# Patient Record
Sex: FEMALE | Race: WHITE | NOT HISPANIC OR LATINO | Employment: PART TIME | URBAN - NONMETROPOLITAN AREA
[De-identification: names, ages, dates, MRNs, and addresses within clinical notes are randomized per-mention and may not be internally consistent; named-entity substitution may affect disease eponyms.]

---

## 2024-07-23 DIAGNOSIS — I25.10 CORONARY ARTERY DISEASE INVOLVING NATIVE CORONARY ARTERY OF NATIVE HEART, UNSPECIFIED WHETHER ANGINA PRESENT: ICD-10-CM

## 2024-07-23 RX ORDER — PRASUGREL 10 MG/1
10 TABLET, FILM COATED ORAL DAILY
Qty: 90 TABLET | Refills: 0 | Status: SHIPPED | OUTPATIENT
Start: 2024-07-23 | End: 2025-07-23

## 2024-07-23 RX ORDER — ROSUVASTATIN CALCIUM 5 MG/1
5 TABLET, COATED ORAL DAILY
Qty: 90 TABLET | Refills: 0 | Status: SHIPPED | OUTPATIENT
Start: 2024-07-23 | End: 2025-07-23

## 2024-10-12 ENCOUNTER — HOSPITAL ENCOUNTER (EMERGENCY)
Facility: HOSPITAL | Age: 44
Discharge: HOME | End: 2024-10-12
Attending: EMERGENCY MEDICINE

## 2024-10-12 VITALS
SYSTOLIC BLOOD PRESSURE: 140 MMHG | BODY MASS INDEX: 43.06 KG/M2 | TEMPERATURE: 97.4 F | HEART RATE: 84 BPM | DIASTOLIC BLOOD PRESSURE: 86 MMHG | OXYGEN SATURATION: 96 % | WEIGHT: 234 LBS | HEIGHT: 62 IN | RESPIRATION RATE: 18 BRPM

## 2024-10-12 DIAGNOSIS — K02.9 PAIN DUE TO DENTAL CARIES: Primary | ICD-10-CM

## 2024-10-12 PROCEDURE — 2500000001 HC RX 250 WO HCPCS SELF ADMINISTERED DRUGS (ALT 637 FOR MEDICARE OP): Performed by: EMERGENCY MEDICINE

## 2024-10-12 PROCEDURE — 99283 EMERGENCY DEPT VISIT LOW MDM: CPT

## 2024-10-12 RX ORDER — AMOXICILLIN 500 MG/1
500 CAPSULE ORAL 3 TIMES DAILY
Qty: 21 CAPSULE | Refills: 0 | Status: SHIPPED | OUTPATIENT
Start: 2024-10-12 | End: 2024-10-19

## 2024-10-12 RX ORDER — AMOXICILLIN 250 MG/1
500 CAPSULE ORAL ONCE
Status: COMPLETED | OUTPATIENT
Start: 2024-10-12 | End: 2024-10-12

## 2024-10-12 RX ORDER — IBUPROFEN 600 MG/1
600 TABLET ORAL EVERY 8 HOURS PRN
Qty: 30 TABLET | Refills: 0 | Status: SHIPPED | OUTPATIENT
Start: 2024-10-12

## 2024-10-12 RX ORDER — IBUPROFEN 600 MG/1
600 TABLET ORAL ONCE
Status: COMPLETED | OUTPATIENT
Start: 2024-10-12 | End: 2024-10-12

## 2024-10-12 RX ADMIN — IBUPROFEN 600 MG: 600 TABLET ORAL at 20:09

## 2024-10-12 RX ADMIN — AMOXICILLIN 500 MG: 250 CAPSULE ORAL at 20:08

## 2024-10-12 ASSESSMENT — COLUMBIA-SUICIDE SEVERITY RATING SCALE - C-SSRS
6. HAVE YOU EVER DONE ANYTHING, STARTED TO DO ANYTHING, OR PREPARED TO DO ANYTHING TO END YOUR LIFE?: NO
1. IN THE PAST MONTH, HAVE YOU WISHED YOU WERE DEAD OR WISHED YOU COULD GO TO SLEEP AND NOT WAKE UP?: NO
2. HAVE YOU ACTUALLY HAD ANY THOUGHTS OF KILLING YOURSELF?: NO

## 2024-10-12 ASSESSMENT — PAIN - FUNCTIONAL ASSESSMENT: PAIN_FUNCTIONAL_ASSESSMENT: 0-10

## 2024-10-12 ASSESSMENT — PAIN SCALES - GENERAL
PAINLEVEL_OUTOF10: 9
PAINLEVEL_OUTOF10: 9

## 2024-10-12 ASSESSMENT — PAIN DESCRIPTION - LOCATION: LOCATION: TEETH

## 2024-10-12 NOTE — DISCHARGE INSTRUCTIONS
Ibuprofen and amoxicillin as prescribed.    Follow-up with the [facility] dental clinic Monday morning for outpatient care.    Return for worsening symptoms or concerns.

## 2024-10-12 NOTE — ED PROVIDER NOTES
[facility]   ED  Provider Note  10/12/2024  7:40 PM  AC01/AC01      No chief complaint on file.       History of Present Illness:   Masha Parrish is a 44 y.o. female presenting to the ED for dental pain, beginning several days ago.  The complaint has been persistent, gradually worsening in severity, and worsened by chewing.  Patient has multiple dental caries along the left side of her lower jaw.  She claims increasing pain for the past 4 to 5 days.  She denies any fever chills difficulty breathing difficulty swallowing.  She does not have a stiff neck or headache.      Review of Systems:   Pertinent positives and review of systems as noted above.  Remaining 10 review of systems is negative or noncontributory to today's episode of care.  Review of Systems       --------------------------------------------- PAST HISTORY ---------------------------------------------  Past Medical History: No past medical history on file.     Past Surgical History: No past surgical history on file.     Social History:   Social History     Social History Narrative    Not on file        Family History: family history is not on file. Unless otherwise noted, family history is non contributory    Patient's Medications   New Prescriptions    No medications on file   Previous Medications    PRASUGREL (EFFIENT) 10 MG TABLET    Take 1 tablet (10 mg) by mouth once daily.    ROSUVASTATIN (CRESTOR) 5 MG TABLET    Take 1 tablet (5 mg) by mouth once daily.   Modified Medications    No medications on file   Discontinued Medications    No medications on file      The patient’s home medications have been reviewed.    Allergies: Patient has no allergy information on record.    -------------------------------------------------- RESULTS -------------------------------------------------  All laboratory and radiology results have been personally reviewed by myself   LABS:  Labs Reviewed - No data to display      RADIOLOGY:  Interpreted by  Radiologist.  No orders to display       No results found for this or any previous visit (from the past 4464 hour(s)).  ------------------------- NURSING NOTES AND VITALS REVIEWED ---------------------------   The nursing notes within the ED encounter and vital signs as below have been reviewed.   There were no vitals taken for this visit.  Oxygen Saturation Interpretation: Normal      ---------------------------------------------------PHYSICAL EXAM--------------------------------------  Physical Exam   Constitutional/General: Alert,  well appearing, non toxic in NAD  Head: Normocephalic and atraumatic  Eyes: PERRL, EOMI, conjunctiva normal, sclera non icteric  Mouth: Oropharynx clear, handling secretions, no trismus, no asymmetry of the posterior oropharynx or uvular edema multiple broken off and rotting teeth in the left lower mandible.  Neck: Supple, full ROM, non tender to palpation in the midline, no stridor, no crepitus, no meningeal signs  Respiratory: Lungs clear to auscultation bilaterally, no wheezes, rales, or rhonchi. Not in respiratory distress  Cardiovascular:  Regular rate. Regular rhythm. No murmurs, gallops, or rubs. 2+ distal pulses  Chest: No chest wall tenderness  GI:  Abdomen Soft, Non tender, Non distended.  +BS. No organomegaly, no palpable masses,  No rebound, guarding, or rigidity.   Musculoskeletal: Moves all extremities x 4. Warm and well perfused, no clubbing, cyanosis, or edema. Capillary refill <3 seconds  Integument: skin warm and dry. No rashes.   Lymphatic: Mild left submandibular adenopathy  Neurologic: No focal deficits, symmetric strength 5/5 in the upper and lower extremities bilaterally  Psychiatric: Normal Affect    Procedures    ------------------------------ ED COURSE/MEDICAL DECISION MAKING----------------------  Diagnoses as of 10/12/24 1947   Pain due to dental caries      Patient has multiple infected teeth in the left lower gum that are broken off.  She replaced on  amoxicillin and ibuprofen.  I have asked to follow-up with [facility] on Monday for outpatient care.      Medical Decision Making:   Discharged to home    Diagnoses as of 10/12/24 1947   Pain due to dental caries      Counseling:   The emergency provider has spoken with the patient and discussed today’s results, in addition to providing specific details for the plan of care and counseling regarding the diagnosis and prognosis.  Questions are answered at this time and they are agreeable with the plan.      --------------------------------- IMPRESSION AND DISPOSITION ---------------------------------        IMPRESSION  1. Pain due to dental caries        DISPOSITION  Disposition: Discharge to home  Patient condition is fair      Billing Provider Critical Care Time: 0 minutes     Jerod Hearn MD  10/12/24 1952

## 2024-10-28 DIAGNOSIS — I25.10 CORONARY ARTERY DISEASE INVOLVING NATIVE CORONARY ARTERY OF NATIVE HEART, UNSPECIFIED WHETHER ANGINA PRESENT: ICD-10-CM

## 2024-10-29 RX ORDER — ROSUVASTATIN CALCIUM 5 MG/1
5 TABLET, COATED ORAL DAILY
Qty: 90 TABLET | Refills: 0 | Status: SHIPPED | OUTPATIENT
Start: 2024-10-29 | End: 2025-10-29

## 2024-10-29 RX ORDER — PRASUGREL 10 MG/1
10 TABLET, FILM COATED ORAL DAILY
Qty: 90 TABLET | Refills: 0 | Status: SHIPPED | OUTPATIENT
Start: 2024-10-29 | End: 2025-10-29

## 2024-11-13 DIAGNOSIS — I10 HYPERTENSION, UNSPECIFIED TYPE: ICD-10-CM

## 2024-11-13 DIAGNOSIS — I25.10 CORONARY ARTERY DISEASE INVOLVING NATIVE CORONARY ARTERY OF NATIVE HEART, UNSPECIFIED WHETHER ANGINA PRESENT: Primary | ICD-10-CM

## 2024-11-13 RX ORDER — ESCITALOPRAM OXALATE 20 MG/1
1 TABLET ORAL DAILY
COMMUNITY
Start: 2022-02-24

## 2024-11-13 RX ORDER — PRASUGREL 10 MG/1
10 TABLET, FILM COATED ORAL DAILY
Qty: 30 TABLET | Refills: 0 | Status: SHIPPED | OUTPATIENT
Start: 2024-11-13 | End: 2025-11-13

## 2024-11-13 RX ORDER — ROSUVASTATIN CALCIUM 5 MG/1
5 TABLET, COATED ORAL DAILY
Qty: 30 TABLET | Refills: 0 | Status: SHIPPED | OUTPATIENT
Start: 2024-11-13 | End: 2025-11-13

## 2024-11-13 RX ORDER — TRAZODONE HYDROCHLORIDE 100 MG/1
1 TABLET ORAL NIGHTLY
COMMUNITY
Start: 2022-02-24

## 2024-11-13 RX ORDER — ATORVASTATIN CALCIUM 80 MG/1
80 TABLET, FILM COATED ORAL DAILY
COMMUNITY
Start: 2019-03-27

## 2024-11-13 RX ORDER — ACETAMINOPHEN 325 MG/1
325 TABLET ORAL EVERY 4 HOURS PRN
COMMUNITY
Start: 2019-03-27

## 2024-11-13 RX ORDER — METOPROLOL SUCCINATE 25 MG/1
1 TABLET, EXTENDED RELEASE ORAL DAILY
COMMUNITY
Start: 2019-03-27

## 2024-11-13 RX ORDER — ASPIRIN 81 MG/1
1 TABLET ORAL DAILY
COMMUNITY
Start: 2019-03-27

## 2024-11-22 ENCOUNTER — OFFICE VISIT (OUTPATIENT)
Dept: CARDIOLOGY | Facility: CLINIC | Age: 44
End: 2024-11-22

## 2024-11-22 VITALS
OXYGEN SATURATION: 97 % | HEART RATE: 87 BPM | SYSTOLIC BLOOD PRESSURE: 118 MMHG | BODY MASS INDEX: 46.56 KG/M2 | DIASTOLIC BLOOD PRESSURE: 77 MMHG | HEIGHT: 62 IN | WEIGHT: 253 LBS

## 2024-11-22 DIAGNOSIS — I10 HYPERTENSION, UNSPECIFIED TYPE: ICD-10-CM

## 2024-11-22 DIAGNOSIS — I25.10 CORONARY ARTERY DISEASE INVOLVING NATIVE CORONARY ARTERY OF NATIVE HEART, UNSPECIFIED WHETHER ANGINA PRESENT: ICD-10-CM

## 2024-11-22 DIAGNOSIS — E78.2 MIXED HYPERLIPIDEMIA: Primary | ICD-10-CM

## 2024-11-22 DIAGNOSIS — F17.200 SMOKING: ICD-10-CM

## 2024-11-22 PROCEDURE — 99406 BEHAV CHNG SMOKING 3-10 MIN: CPT | Performed by: NURSE PRACTITIONER

## 2024-11-22 PROCEDURE — 3074F SYST BP LT 130 MM HG: CPT | Performed by: NURSE PRACTITIONER

## 2024-11-22 PROCEDURE — 3078F DIAST BP <80 MM HG: CPT | Performed by: NURSE PRACTITIONER

## 2024-11-22 PROCEDURE — 3008F BODY MASS INDEX DOCD: CPT | Performed by: NURSE PRACTITIONER

## 2024-11-22 PROCEDURE — 99213 OFFICE O/P EST LOW 20 MIN: CPT | Performed by: NURSE PRACTITIONER

## 2024-11-22 RX ORDER — PRASUGREL 10 MG/1
10 TABLET, FILM COATED ORAL DAILY
Qty: 30 TABLET | Refills: 11 | Status: SHIPPED | OUTPATIENT
Start: 2024-11-22 | End: 2025-11-22

## 2024-11-22 RX ORDER — NITROGLYCERIN 0.4 MG/1
0.4 TABLET SUBLINGUAL EVERY 5 MIN PRN
Qty: 100 TABLET | Refills: 11 | Status: SHIPPED | OUTPATIENT
Start: 2024-11-22 | End: 2025-11-22

## 2024-11-22 RX ORDER — ROSUVASTATIN CALCIUM 5 MG/1
5 TABLET, COATED ORAL DAILY
Qty: 30 TABLET | Refills: 11 | Status: SHIPPED | OUTPATIENT
Start: 2024-11-22 | End: 2025-11-22

## 2024-11-22 NOTE — PROGRESS NOTES
Primary Care Physician: No Assigned PCP Generic Provider, MD    Date of Visit: 11/22/2024 11:00 AM EST  Location of visit:  [facility]   Type of Visit: Follow up        Chief Complaint   Patient presents with    Follow-up     Here for 1 year follow up, no cardiac complaints today     HPI / Summary:   Masha Parrish is a 44 y.o. female  with CAD. Anterior STEMI S/P PPCI -LAD with VINNIE 3/26/2019. Preserved LV systolic function. HLP. Smoking. F/h/o CAD. Obesity. SAMI    who returns for routine follow up     Notes some Sob with activity feels like due to weight gain. Left arm numbness, Sleep on it wrong. Not during the day with work or activity. Really paying attention to symptoms.   She is working on becoming more active as well as smoking cessation.     12 system review is negative except as noted above    Medical History:   No past medical history on file.    Social History:   Tobacco Use: High Risk (10/12/2024)    Patient History     Smoking Tobacco Use: Every Day     Smokeless Tobacco Use: Current     Passive Exposure: Not on file       MEDICATIONS:   Current Outpatient Medications   Medication Instructions    acetaminophen (TYLENOL) 325 mg, Every 4 hours PRN    aspirin 81 mg EC tablet 1 tablet, Daily    atorvastatin (LIPITOR) 80 mg, Daily    escitalopram (Lexapro) 20 mg tablet 1 tablet, Daily    ibuprofen 600 mg, oral, Every 8 hours PRN    metoprolol succinate XL (Toprol-XL) 25 mg 24 hr tablet 1 tablet, Daily    prasugrel (EFFIENT) 10 mg, oral, Daily    rosuvastatin (CRESTOR) 5 mg, oral, Daily    ticagrelor (BRILINTA) 90 mg, 2 times daily    traZODone (Desyrel) 100 mg tablet 1 tablet, Nightly       IMAGING REPORTS INDEPENDENTLY REVIEWED:   NM stress test 3/30/2023  IMPRESSION:  1. Negative myocardial perfusion study without evidence of inducible  myocardial ischemia or prior infarction.  2. The left ventricle is normal in size.  3. Normal LV wall motion with a post-stress LV EF estimated at 65%.   Summary:  1.  No electrocardiographic or clinical evidence for ischemia at a maximal infusion.  2. Nuclear image results are reported separately.     Echocardiogram 3/14/2023  CONCLUSIONS:  1. Left ventricular systolic function is normal with a 55-60% estimated ejection fraction.    [facility] 3/26/2019  Recommendations:  Aspirin 81mg daily indefinitely and P2Y12 inhibition for at least one year.  Optiimze CAD medical therapy.  Consider cardiac rehab.  Smoking cessation is essential.     ____________________________________________________________________________________  CONCLUSIONS:   1. Left main: no significant angiographic disease.   2. LAD: 90% proximal LAD STEMI culprit lesion.   3. LCx: no significant angiographic disease.   4. RCA: no significant angiographic disease.   5. LVEDP 26mmHg, no aortic stenosis on LV-Ao gradient.   6. Successful PCI to proximal LAD STEMI culprit with a 3.0 x 30mm Resolute VINNIE post-dilated distally with a 3.0mm NC balloon at 24atm and proximally with a 3.5mm NC balloon at 24atm.       LABS:    CBC with Differential:    Lab Results   Component Value Date    WBC CANCELED 03/14/2023    RBC CANCELED 03/14/2023    HGB CANCELED 03/14/2023    HCT CANCELED 03/14/2023    PLT CANCELED 03/14/2023    MCV CANCELED 03/14/2023    MCHC CANCELED 03/14/2023    RDW CANCELED 03/14/2023    NRBC CANCELED 03/14/2023    LYMPHOPCT 33.0 03/13/2023    MONOPCT 5.4 03/13/2023    EOSPCT 1.8 03/13/2023    BASOPCT 0.3 03/13/2023    MONOSABS 0.58 03/13/2023    LYMPHSABS 3.53 03/13/2023    EOSABS 0.19 03/13/2023    BASOSABS 0.03 03/13/2023     CMP:    Lab Results   Component Value Date    NA CANCELED 03/14/2023    K CANCELED 03/14/2023    CL CANCELED 03/14/2023    CO2 CANCELED 03/14/2023    BUN CANCELED 03/14/2023    CREATININE CANCELED 03/14/2023    GLUCOSE CANCELED 03/14/2023    PROT 7.4 03/25/2019    CALCIUM CANCELED 03/14/2023    BILITOT 0.2 03/25/2019    ALKPHOS 69 03/25/2019    AST 22 03/25/2019    ALT 25 03/25/2019     BMP:   "  Lab Results   Component Value Date    NA CANCELED 03/14/2023    K CANCELED 03/14/2023    CL CANCELED 03/14/2023    CO2 CANCELED 03/14/2023    BUN CANCELED 03/14/2023    CREATININE CANCELED 03/14/2023    CALCIUM CANCELED 03/14/2023    GLUCOSE CANCELED 03/14/2023     Magnesium:No results found for: \"MG\"  Troponin:    Lab Results   Component Value Date    TROPHS CANCELED 03/13/2023    TROPHS CANCELED 03/13/2023    TROPHS 4 03/13/2023     BNP: No results found for: \"BNP\"      Lipid Panel:  Lab Results   Component Value Date    HDL CANCELED 03/14/2023    CHHDL CANCELED 03/14/2023    VLDL CANCELED 03/14/2023    TRIG CANCELED 03/14/2023    NHDL CANCELED 03/14/2023        Lab work and imaging results independently reviewed by me     Visit Vitals  /77   Pulse 87   Ht 1.575 m (5' 2\")   Wt 115 kg (253 lb)   SpO2 97%   BMI 46.27 kg/m²   Smoking Status Every Day   BSA 2.24 m²       Vitals reviewed.   Constitutional:       General: Awake.      Appearance: Normal appearance. Well-developed and not in distress. Morbidly obese.   Eyes:      General: Lids are normal.      Pupils: Pupils are equal, round, and reactive to light.   HENT:      Right Ear: External ear normal.      Left Ear: External ear normal.      Nose: Nose normal.    Mouth/Throat:      Lips: Pink.      Mouth: Mucous membranes are moist.   Neck:      Vascular: JVD normal.   Pulmonary:      Breath sounds: Normal air entry.   Cardiovascular:      PMI at left midclavicular line. Normal rate. Regular rhythm. Normal S1. Normal S2.       Murmurs: There is no murmur.   Edema:     Peripheral edema absent.   Abdominal:      General: Bowel sounds are normal.      Palpations: Abdomen is soft.      Tenderness: There is no abdominal tenderness.   Musculoskeletal: Normal range of motion.      Cervical back: Full passive range of motion without pain, normal range of motion and neck supple. Skin:     General: Skin is warm and dry.   Neurological:      General: No focal deficit " present.      Mental Status: Alert, oriented to person, place, and time and oriented to person, place and time. Mental status is at baseline.   Psychiatric:         Attention and Perception: Attention normal.         Mood and Affect: Mood normal.         Speech: Speech normal.         Behavior: Behavior is cooperative.         Problem List Items Addressed This Visit             ICD-10-CM    Coronary artery disease involving native coronary artery of native heart I25.10    Relevant Medications    prasugrel (Effient) 10 mg tablet    rosuvastatin (Crestor) 5 mg tablet    Hypertension I10    Relevant Medications    prasugrel (Effient) 10 mg tablet    rosuvastatin (Crestor) 5 mg tablet     Masha is doing well from a CV perspective without angina or symptoms suggestive of decompensated HF   --Prasugrel 10mg daily indefinitely or ASA 81mg daily  --Metoprolol succinate 25mg daily  --SL nitroglycerin script given and education provided    HTN, controlled     HLD, mixed  --No recent LDL  -- Rosuvastatin 5mg daily  Mediterranean / DASH diet   150 minutes of symptom limited exercise / week     Follow up 1 year or sooner if needed  Encourage follow up and establishing with PCP.- she is between jobs right now and without insurance    We discussed the importance of complete smoking cessations, pharmacological and non pharmacological options to do so.  She declines assistance at this time.    11/22/24 at 10:48 AM - SAGE Hough    Orders:  No orders of the defined types were placed in this encounter.      Followup Appts:  Future Appointments   Date Time Provider Department Center   11/22/2024 11:00 AM SAGE Hough [id number] [place]

## 2024-11-22 NOTE — PATIENT INSTRUCTIONS
Continue all medication as prescribed    Follow up in 1 year or sooner if needed  Call with questions or concerns

## 2024-11-26 PROBLEM — F17.200 SMOKING: Status: ACTIVE | Noted: 2024-11-26

## 2024-11-26 PROBLEM — E78.2 MIXED HYPERLIPIDEMIA: Status: ACTIVE | Noted: 2024-11-26

## 2024-12-03 ENCOUNTER — APPOINTMENT (OUTPATIENT)
Dept: CARDIOLOGY | Facility: CLINIC | Age: 44
End: 2024-12-03

## 2025-06-14 ENCOUNTER — APPOINTMENT (OUTPATIENT)
Dept: CARDIOLOGY | Facility: HOSPITAL | Age: 45
End: 2025-06-14
Payer: COMMERCIAL

## 2025-06-14 ENCOUNTER — APPOINTMENT (OUTPATIENT)
Dept: RADIOLOGY | Facility: HOSPITAL | Age: 45
End: 2025-06-14
Payer: COMMERCIAL

## 2025-06-14 ENCOUNTER — HOSPITAL ENCOUNTER (EMERGENCY)
Facility: HOSPITAL | Age: 45
Discharge: HOME | End: 2025-06-14
Attending: EMERGENCY MEDICINE
Payer: COMMERCIAL

## 2025-06-14 VITALS
BODY MASS INDEX: 46.56 KG/M2 | DIASTOLIC BLOOD PRESSURE: 63 MMHG | HEIGHT: 62 IN | HEART RATE: 79 BPM | RESPIRATION RATE: 18 BRPM | SYSTOLIC BLOOD PRESSURE: 105 MMHG | TEMPERATURE: 98 F | OXYGEN SATURATION: 95 % | WEIGHT: 253 LBS

## 2025-06-14 DIAGNOSIS — Z72.0 TOBACCO USE: ICD-10-CM

## 2025-06-14 DIAGNOSIS — J40 BRONCHITIS: Primary | ICD-10-CM

## 2025-06-14 LAB
ALBUMIN SERPL BCP-MCNC: 4.4 G/DL (ref 3.4–5)
ALP SERPL-CCNC: 73 U/L (ref 33–110)
ALT SERPL W P-5'-P-CCNC: 54 U/L (ref 7–45)
ANION GAP SERPL CALC-SCNC: 9 MMOL/L (ref 10–20)
AST SERPL W P-5'-P-CCNC: 29 U/L (ref 9–39)
B-HCG SERPL-ACNC: 3 MIU/ML
BASOPHILS # BLD AUTO: 0.03 X10*3/UL (ref 0–0.1)
BASOPHILS NFR BLD AUTO: 0.3 %
BILIRUB SERPL-MCNC: 0.3 MG/DL (ref 0–1.2)
BUN SERPL-MCNC: 11 MG/DL (ref 6–23)
CALCIUM SERPL-MCNC: 9.8 MG/DL (ref 8.6–10.3)
CARDIAC TROPONIN I PNL SERPL HS: 7 NG/L (ref 0–13)
CARDIAC TROPONIN I PNL SERPL HS: 8 NG/L (ref 0–13)
CHLORIDE SERPL-SCNC: 101 MMOL/L (ref 98–107)
CO2 SERPL-SCNC: 29 MMOL/L (ref 21–32)
CREAT SERPL-MCNC: 1.02 MG/DL (ref 0.5–1.05)
D DIMER PPP FEU-MCNC: 314 NG/ML FEU
EGFRCR SERPLBLD CKD-EPI 2021: 69 ML/MIN/1.73M*2
EOSINOPHIL # BLD AUTO: 0.16 X10*3/UL (ref 0–0.7)
EOSINOPHIL NFR BLD AUTO: 1.6 %
ERYTHROCYTE [DISTWIDTH] IN BLOOD BY AUTOMATED COUNT: 14.9 % (ref 11.5–14.5)
GLUCOSE SERPL-MCNC: 124 MG/DL (ref 74–99)
HCT VFR BLD AUTO: 45.3 % (ref 36–46)
HGB BLD-MCNC: 14.7 G/DL (ref 12–16)
IMM GRANULOCYTES # BLD AUTO: 0.1 X10*3/UL (ref 0–0.7)
IMM GRANULOCYTES NFR BLD AUTO: 1 % (ref 0–0.9)
LYMPHOCYTES # BLD AUTO: 3.54 X10*3/UL (ref 1.2–4.8)
LYMPHOCYTES NFR BLD AUTO: 34.3 %
MCH RBC QN AUTO: 26.7 PG (ref 26–34)
MCHC RBC AUTO-ENTMCNC: 32.5 G/DL (ref 32–36)
MCV RBC AUTO: 82 FL (ref 80–100)
MONOCYTES # BLD AUTO: 0.68 X10*3/UL (ref 0.1–1)
MONOCYTES NFR BLD AUTO: 6.6 %
NEUTROPHILS # BLD AUTO: 5.81 X10*3/UL (ref 1.2–7.7)
NEUTROPHILS NFR BLD AUTO: 56.2 %
NRBC BLD-RTO: 0 /100 WBCS (ref 0–0)
PLATELET # BLD AUTO: 294 X10*3/UL (ref 150–450)
POTASSIUM SERPL-SCNC: 3.2 MMOL/L (ref 3.5–5.3)
PROT SERPL-MCNC: 7.6 G/DL (ref 6.4–8.2)
RBC # BLD AUTO: 5.51 X10*6/UL (ref 4–5.2)
SODIUM SERPL-SCNC: 136 MMOL/L (ref 136–145)
WBC # BLD AUTO: 10.3 X10*3/UL (ref 4.4–11.3)

## 2025-06-14 PROCEDURE — 2500000002 HC RX 250 W HCPCS SELF ADMINISTERED DRUGS (ALT 637 FOR MEDICARE OP, ALT 636 FOR OP/ED): Performed by: EMERGENCY MEDICINE

## 2025-06-14 PROCEDURE — 94640 AIRWAY INHALATION TREATMENT: CPT

## 2025-06-14 PROCEDURE — 71045 X-RAY EXAM CHEST 1 VIEW: CPT | Performed by: STUDENT IN AN ORGANIZED HEALTH CARE EDUCATION/TRAINING PROGRAM

## 2025-06-14 PROCEDURE — 84484 ASSAY OF TROPONIN QUANT: CPT | Mod: 91 | Performed by: EMERGENCY MEDICINE

## 2025-06-14 PROCEDURE — 94640 AIRWAY INHALATION TREATMENT: CPT | Mod: MUEWO

## 2025-06-14 PROCEDURE — 85025 COMPLETE CBC W/AUTO DIFF WBC: CPT | Performed by: EMERGENCY MEDICINE

## 2025-06-14 PROCEDURE — 94664 DEMO&/EVAL PT USE INHALER: CPT | Mod: 59

## 2025-06-14 PROCEDURE — 2500000001 HC RX 250 WO HCPCS SELF ADMINISTERED DRUGS (ALT 637 FOR MEDICARE OP)

## 2025-06-14 PROCEDURE — 2500000004 HC RX 250 GENERAL PHARMACY W/ HCPCS (ALT 636 FOR OP/ED): Performed by: EMERGENCY MEDICINE

## 2025-06-14 PROCEDURE — 2500000001 HC RX 250 WO HCPCS SELF ADMINISTERED DRUGS (ALT 637 FOR MEDICARE OP): Performed by: EMERGENCY MEDICINE

## 2025-06-14 PROCEDURE — 93005 ELECTROCARDIOGRAM TRACING: CPT

## 2025-06-14 PROCEDURE — 94760 N-INVAS EAR/PLS OXIMETRY 1: CPT | Mod: CCI

## 2025-06-14 PROCEDURE — 71045 X-RAY EXAM CHEST 1 VIEW: CPT

## 2025-06-14 PROCEDURE — 84702 CHORIONIC GONADOTROPIN TEST: CPT | Performed by: EMERGENCY MEDICINE

## 2025-06-14 PROCEDURE — 36415 COLL VENOUS BLD VENIPUNCTURE: CPT | Performed by: EMERGENCY MEDICINE

## 2025-06-14 PROCEDURE — 80053 COMPREHEN METABOLIC PANEL: CPT | Performed by: EMERGENCY MEDICINE

## 2025-06-14 PROCEDURE — 85379 FIBRIN DEGRADATION QUANT: CPT | Performed by: EMERGENCY MEDICINE

## 2025-06-14 PROCEDURE — 84484 ASSAY OF TROPONIN QUANT: CPT | Performed by: EMERGENCY MEDICINE

## 2025-06-14 PROCEDURE — 99285 EMERGENCY DEPT VISIT HI MDM: CPT | Mod: 25 | Performed by: EMERGENCY MEDICINE

## 2025-06-14 RX ORDER — ALBUTEROL SULFATE 90 UG/1
INHALANT RESPIRATORY (INHALATION)
Status: COMPLETED
Start: 2025-06-14 | End: 2025-06-14

## 2025-06-14 RX ORDER — PREDNISONE 20 MG/1
60 TABLET ORAL ONCE
Status: COMPLETED | OUTPATIENT
Start: 2025-06-14 | End: 2025-06-14

## 2025-06-14 RX ORDER — ALBUTEROL SULFATE 0.83 MG/ML
2.5 SOLUTION RESPIRATORY (INHALATION) EVERY 6 HOURS PRN
Qty: 90 ML | Refills: 0 | Status: SHIPPED | OUTPATIENT
Start: 2025-06-14

## 2025-06-14 RX ORDER — DOXYCYCLINE 100 MG/1
100 CAPSULE ORAL 2 TIMES DAILY
Qty: 20 CAPSULE | Refills: 0 | Status: SHIPPED | OUTPATIENT
Start: 2025-06-14 | End: 2025-06-24

## 2025-06-14 RX ORDER — ALBUTEROL SULFATE 0.83 MG/ML
5 SOLUTION RESPIRATORY (INHALATION) ONCE
Status: COMPLETED | OUTPATIENT
Start: 2025-06-14 | End: 2025-06-14

## 2025-06-14 RX ORDER — FLUCONAZOLE 150 MG/1
150 TABLET ORAL
Qty: 1 TABLET | Refills: 1 | Status: SHIPPED | OUTPATIENT
Start: 2025-06-14

## 2025-06-14 RX ORDER — DOXYCYCLINE HYCLATE 100 MG
100 TABLET ORAL ONCE
Status: COMPLETED | OUTPATIENT
Start: 2025-06-14 | End: 2025-06-14

## 2025-06-14 RX ORDER — PREDNISONE 20 MG/1
20 TABLET ORAL 2 TIMES DAILY
Qty: 10 TABLET | Refills: 0 | Status: SHIPPED | OUTPATIENT
Start: 2025-06-14 | End: 2025-06-19

## 2025-06-14 RX ORDER — IPRATROPIUM BROMIDE AND ALBUTEROL SULFATE 2.5; .5 MG/3ML; MG/3ML
3 SOLUTION RESPIRATORY (INHALATION) ONCE
Status: COMPLETED | OUTPATIENT
Start: 2025-06-14 | End: 2025-06-14

## 2025-06-14 RX ADMIN — PREDNISONE 60 MG: 20 TABLET ORAL at 23:46

## 2025-06-14 RX ADMIN — ALBUTEROL SULFATE 5 MG: 2.5 SOLUTION RESPIRATORY (INHALATION) at 23:46

## 2025-06-14 RX ADMIN — ALBUTEROL SULFATE: 90 AEROSOL, METERED RESPIRATORY (INHALATION) at 23:38

## 2025-06-14 RX ADMIN — DOXYCYCLINE HYCLATE 100 MG: 100 TABLET, COATED ORAL at 23:46

## 2025-06-14 RX ADMIN — IPRATROPIUM BROMIDE AND ALBUTEROL SULFATE 3 ML: .5; 3 SOLUTION RESPIRATORY (INHALATION) at 22:18

## 2025-06-14 ASSESSMENT — PAIN SCALES - GENERAL
PAINLEVEL_OUTOF10: 9
PAINLEVEL_OUTOF10: 0 - NO PAIN
PAINLEVEL_OUTOF10: 6
PAINLEVEL_OUTOF10: 0 - NO PAIN

## 2025-06-14 ASSESSMENT — HEART SCORE
ECG: NORMAL
AGE: 45-64
RISK FACTORS: >2 RISK FACTORS OR HX OF ATHEROSCLEROTIC DISEASE
HISTORY: SLIGHTLY SUSPICIOUS
HEART SCORE: 3
TROPONIN: LESS THAN OR EQUAL TO NORMAL LIMIT

## 2025-06-14 ASSESSMENT — PAIN - FUNCTIONAL ASSESSMENT: PAIN_FUNCTIONAL_ASSESSMENT: 0-10

## 2025-06-14 ASSESSMENT — PAIN DESCRIPTION - DESCRIPTORS
DESCRIPTORS: HEAVINESS
DESCRIPTORS: HEAVINESS

## 2025-06-15 NOTE — DISCHARGE INSTRUCTIONS
You may use the albuterol every 4-6 hours as needed for wheezing or chest tightness.    Discontinue Augmentin.    Use a vaporizer or cool mist  humidifier.    Tylenol as needed for fever or pain.    Return to the emergency department for uncontrolled fever, worsening shortness of breath, coughing up blood, vomiting.

## 2025-06-15 NOTE — ED TRIAGE NOTES
Pt to ED for reports of chest heaviness and pain on inhalation and trouble getting full breaths. Pt had MI with stent placement in March of 2019. Pt does not take blood thinners and does not take her ASA as cleared by cardiologist due to excess bleeding

## 2025-06-15 NOTE — ED PROVIDER NOTES
HPI   Chief Complaint   Patient presents with    Shortness of Breath     Hypoxia per patient and chest feels heavy         History provided by:  Medical records and patient   used: No      This patient presents to the emergency department ambulatory via private vehicle with a 2-day history of chest tightness and dyspnea on exertion.  Patient states a week ago she started having flareup of pain and swelling in left mandible or teeth.  Patient states that she has bad teeth and they get infected.  She saw the urgent care and was started on oral Augmentin.  After about 3 days of this she said she got a lot of sinus drainage, and after that she started having trouble with coughing and noted that her chest was tight.  Patient states she is a smoker.  She has never used an inhaler.    She has a family member who has a home pulse ox and they checked it and it was 89.  She decided come to hospital.  She denies any swelling of feet or legs.  No nausea or vomiting.  No lightheadedness or dizziness.  Patient says that she has been getting some diarrhea related to the Augmentin.    Patient has a history of coronary disease status post stent.  She states that the tightness in her chest feels nothing like when she had a heart attack.  She denies any recent fall or injury.  The swelling and pain in her teeth has gone away.  She has not had any fever.      Patient History   Medical History[1]  Surgical History[2]  Family History[3]  Social History[4]    Physical Exam   ED Triage Vitals [06/14/25 2139]   Temperature Heart Rate Respirations BP   36.4 °C (97.5 °F) 84 16 126/67      SpO2 Temp src Heart Rate Source Patient Position   94 % -- -- --      BP Location FiO2 (%)     -- --       Physical Exam  Vitals reviewed.   Constitutional:       Appearance: She is obese.   HENT:      Head: Normocephalic and atraumatic.      Mouth/Throat:      Mouth: Mucous membranes are moist.      Comments: Dental caries left  mandibular premolars and molars without any gingival swelling or redness.  No facial swelling.  Floor the mouth soft and nontender.  Eyes:      Extraocular Movements: Extraocular movements intact.      Pupils: Pupils are equal, round, and reactive to light.   Cardiovascular:      Rate and Rhythm: Normal rate and regular rhythm.      Pulses: Normal pulses.      Heart sounds: Normal heart sounds.   Pulmonary:      Effort: Pulmonary effort is normal.      Breath sounds: Examination of the right-upper field reveals wheezing. Examination of the left-upper field reveals wheezing. Examination of the right-middle field reveals wheezing. Examination of the left-middle field reveals wheezing. Examination of the right-lower field reveals decreased breath sounds. Examination of the left-lower field reveals decreased breath sounds. Decreased breath sounds and wheezing present. No rhonchi or rales.   Chest:      Chest wall: No tenderness.   Abdominal:      General: Bowel sounds are normal.      Palpations: Abdomen is soft.      Tenderness: There is no abdominal tenderness.   Musculoskeletal:      Cervical back: Normal range of motion.      Right lower leg: No tenderness. No edema.      Left lower leg: No tenderness. No edema.   Lymphadenopathy:      Cervical: No cervical adenopathy.   Skin:     General: Skin is warm and dry.      Capillary Refill: Capillary refill takes less than 2 seconds.      Findings: No erythema.   Neurological:      General: No focal deficit present.      Mental Status: She is alert.   Psychiatric:         Mood and Affect: Mood normal.           ED Course & MDM   ED Course as of 06/14/25 2341   Sat Jun 14, 2025 2237 Patient reassessed [MN]   2319 Patient reassessed. [MN]      ED Course User Index  [MN] Miriam Aguirre MD         Diagnoses as of 06/14/25 2341   Bronchitis   Tobacco use          ED Medication Administration from 06/14/2025 2128 to 06/14/2025 2334         Date/Time Order Dose Route Action  Action by     06/14/2025 2214 EDT ipratropium-albuteroL (Duo-Neb) 0.5-2.5 mg/3 mL nebulizer solution 3 mL 3 mL nebulization Given MUKUND [place]          Labs Reviewed   CBC WITH AUTO DIFFERENTIAL - Abnormal       Result Value    WBC 10.3      nRBC 0.0      RBC 5.51 (*)     Hemoglobin 14.7      Hematocrit 45.3      MCV 82      MCH 26.7      MCHC 32.5      RDW 14.9 (*)     Platelets 294      Neutrophils % 56.2      Immature Granulocytes %, Automated 1.0 (*)     Lymphocytes % 34.3      Monocytes % 6.6      Eosinophils % 1.6      Basophils % 0.3      Neutrophils Absolute 5.81      Immature Granulocytes Absolute, Automated 0.10      Lymphocytes Absolute 3.54      Monocytes Absolute 0.68      Eosinophils Absolute 0.16      Basophils Absolute 0.03     COMPREHENSIVE METABOLIC PANEL - Abnormal    Glucose 124 (*)     Sodium 136      Potassium 3.2 (*)     Chloride 101      Bicarbonate 29      Anion Gap 9 (*)     Urea Nitrogen 11      Creatinine 1.02      eGFR 69      Calcium 9.8      Albumin 4.4      Alkaline Phosphatase 73      Total Protein 7.6      AST 29      Bilirubin, Total 0.3      ALT 54 (*)    SERIAL TROPONIN-INITIAL - Normal    Troponin I, High Sensitivity 8      Narrative:     Less than 99th percentile of normal range cutoff-  Female and children under 18 years old <14 ng/L; Male <21 ng/L: Negative  Repeat testing should be performed if clinically indicated.     Female and children under 18 years old 14-50 ng/L; Male 21-50 ng/L:  Consistent with possible cardiac damage and possible increased clinical   risk. Serial measurements may help to assess extent of myocardial damage.     >50 ng/L: Consistent with cardiac damage, increased clinical risk and  myocardial infarction. Serial measurements may help assess extent of   myocardial damage.      NOTE: Children less than 1 year old may have higher baseline troponin   levels and results should be interpreted in conjunction with the overall   clinical context.     NOTE:  Troponin I testing is performed using a different   testing methodology at [facility] than at other   [facility]. Direct result comparisons should only   be made within the same method.   SERIAL TROPONIN, 1 HOUR - Normal    Troponin I, High Sensitivity 7      Narrative:     Less than 99th percentile of normal range cutoff-  Female and children under 18 years old <14 ng/L; Male <21 ng/L: Negative  Repeat testing should be performed if clinically indicated.     Female and children under 18 years old 14-50 ng/L; Male 21-50 ng/L:  Consistent with possible cardiac damage and possible increased clinical   risk. Serial measurements may help to assess extent of myocardial damage.     >50 ng/L: Consistent with cardiac damage, increased clinical risk and  myocardial infarction. Serial measurements may help assess extent of   myocardial damage.      NOTE: Children less than 1 year old may have higher baseline troponin   levels and results should be interpreted in conjunction with the overall   clinical context.     NOTE: Troponin I testing is performed using a different   testing methodology at [facility] than at other   [facility]. Direct result comparisons should only   be made within the same method.   D-DIMER, VTE EXCLUSION - Normal    D-Dimer, Quantitative VTE Exclusion 314      Narrative:     The VTE Exclusion D-Dimer assay is reported in ng/mL Fibrinogen Equivalent Units (FEU).    Per 's instructions for use, a value of less than 500 ng/mL (FEU) may help to exclude DVT or PE in outpatients when the assay is used with a clinical pretest probability assessment.([facility] must utilize and document eCalc 'Wells Score Deep Vein Thrombosis Risk' for DVT exclusion only. Emergency Department should utilize  Guidelines for Emergency Department Use of the VTE Exclusion D-Dimer and Clinical Pretest probability assessment model for DVT or PE exclusion.)   HUMAN CHORIONIC  GONADOTROPIN, SERUM QUANTITATIVE - Normal    HCG, Beta-Quantitative 3      Narrative:      Total HCG measurement is performed using the [employer]vers[employer] Access   Immunoassay which detects intact HCG and free beta HCG subunit.    This test is not indicated for use as a tumor marker.   HCG testing is performed using a different test methodology at [facility] than other [facility]. Direct result comparison   should only be made within the same method.       TROPONIN SERIES- (INITIAL, 1 HR)    Narrative:     The following orders were created for panel order Troponin I Series, High Sensitivity (0, 1 HR).  Procedure                               Abnormality         Status                     ---------                               -----------         ------                     Troponin I, High Sensiti...[[id number]]  Normal              Final result               Troponin, High Sensitivi...[[id number]]  Normal              Final result                 Please view results for these tests on the individual orders.   GRAY TOP     XR chest 1 view   Final Result   1. Increased central perihilar markings which may represent   bronchitis. No consolidation or pleural effusion.             Signed by: Cruz Jaime 6/14/2025 10:27 PM   Dictation workstation:   [id number]                 No data recorded     Andreas Coma Scale Score: 15 (06/14/25 2146 : Kusum Weems RN) HEART Score: 3 (06/14/25 2319 : Miriam Aguirre MD)                 EKG  2133 --twelve-lead EKG was obtained and read by me. This demonstrates normal sinus rhythm with a rate of 89, normal intervals, normal axes, no ectopy, no ischemia, no pericarditis. There was no change compared to most recent prior EKG. 3/13/23  2241 --twelve-lead EKG was obtained and read by me. This demonstrates normal sinus rhythm with a rate of 72, normal intervals, normal axes, no ectopy, no ischemia, no pericarditis. There was no change compared to most recent  prior EKG. arrival        Medical Decision Making  This patient presents emergency department the above history and physical.  No signs of sepsis, dehydration, hypoxemia.  Patient is afebrile.  No evidence of arrhythmia on EKG.  EKG and troponin negative x 2.  Patient does have bronchospasm on auscultation.  Given DuoNeb treatment with improved air entry and improvement in symptoms.    On other laboratory evaluation pregnancy is negative, D-dimer is normal 314.  Chest x-ray obtained and read by radiology consistent with bronchitis.  Patient says she has a home nebulizer machine but does not have any medication for it prescription provided for albuterol.  Given metered-dose inhaler for rescue use as well.  Recommended changing antibiotics from the Augmentin to doxycycline for better pulmonary coverage.  Patient request Diflucan tablets because she gets yeast infections after antibiotics.  Patient not given prescription for prednisone.  She is encouraged to stop smoking.    Patient presented to the emergency department with complaint of chest pain. Evaluation in the emergency department included consideration for STEMI,  acute coronary syndrome, pneumonia, pneumothorax, perforated ulcer, esophageal rupture, venous thromboembolism, acute abdomen. At this time, there is no evidence of acute life-threatening etiology for their symptoms.    Results of exam and any testing were discussed with patient/family. To the best of my ability, I answered all questions. At this time, there is no indication for admission/transfer or further diagnostic testing. Patient understands to return for any new or worsening symptoms, or failure to improve as anticipated. The importance of follow-up was stressed.    Procedure  Procedures       [1] History reviewed. No pertinent past medical history.  [2] History reviewed. No pertinent surgical history.  [3] No family history on file.  [4]   Social History  Tobacco Use    Smoking status: Every  Day     Types: Cigarettes    Smokeless tobacco: Current   Substance Use Topics    Alcohol use: Never    Drug use: Yes     Types: Marijuana     Comment: medical marijuana card        Miriam Aguirre MD  06/15/25 0004

## 2025-06-16 LAB
ATRIAL RATE: 72 BPM
ATRIAL RATE: 89 BPM
HOLD SPECIMEN: NORMAL
P AXIS: 74 DEGREES
P AXIS: 74 DEGREES
P OFFSET: 205 MS
P OFFSET: 207 MS
P ONSET: 153 MS
P ONSET: 157 MS
PR INTERVAL: 126 MS
PR INTERVAL: 134 MS
Q ONSET: 220 MS
Q ONSET: 220 MS
QRS COUNT: 12 BEATS
QRS COUNT: 14 BEATS
QRS DURATION: 78 MS
QRS DURATION: 82 MS
QT INTERVAL: 350 MS
QT INTERVAL: 400 MS
QTC CALCULATION(BAZETT): 425 MS
QTC CALCULATION(BAZETT): 438 MS
QTC FREDERICIA: 399 MS
QTC FREDERICIA: 425 MS
R AXIS: 72 DEGREES
R AXIS: 78 DEGREES
T AXIS: 75 DEGREES
T AXIS: 80 DEGREES
T OFFSET: 395 MS
T OFFSET: 420 MS
VENTRICULAR RATE: 72 BPM
VENTRICULAR RATE: 89 BPM

## 2025-07-18 LAB
ATRIAL RATE: 72 BPM
ATRIAL RATE: 89 BPM
P AXIS: 74 DEGREES
P AXIS: 74 DEGREES
P OFFSET: 205 MS
P OFFSET: 207 MS
P ONSET: 153 MS
P ONSET: 157 MS
PR INTERVAL: 126 MS
PR INTERVAL: 134 MS
Q ONSET: 220 MS
Q ONSET: 220 MS
QRS COUNT: 12 BEATS
QRS COUNT: 14 BEATS
QRS DURATION: 78 MS
QRS DURATION: 82 MS
QT INTERVAL: 350 MS
QT INTERVAL: 400 MS
QTC CALCULATION(BAZETT): 425 MS
QTC CALCULATION(BAZETT): 438 MS
QTC FREDERICIA: 399 MS
QTC FREDERICIA: 425 MS
R AXIS: 72 DEGREES
R AXIS: 78 DEGREES
T AXIS: 75 DEGREES
T AXIS: 80 DEGREES
T OFFSET: 395 MS
T OFFSET: 420 MS
VENTRICULAR RATE: 72 BPM
VENTRICULAR RATE: 89 BPM